# Patient Record
Sex: MALE | Race: WHITE | NOT HISPANIC OR LATINO | Employment: FULL TIME | URBAN - METROPOLITAN AREA
[De-identification: names, ages, dates, MRNs, and addresses within clinical notes are randomized per-mention and may not be internally consistent; named-entity substitution may affect disease eponyms.]

---

## 2023-07-19 ENCOUNTER — OFFICE VISIT (OUTPATIENT)
Dept: FAMILY MEDICINE CLINIC | Facility: CLINIC | Age: 33
End: 2023-07-19
Payer: COMMERCIAL

## 2023-07-19 VITALS
DIASTOLIC BLOOD PRESSURE: 88 MMHG | WEIGHT: 292 LBS | HEIGHT: 73 IN | RESPIRATION RATE: 16 BRPM | OXYGEN SATURATION: 96 % | SYSTOLIC BLOOD PRESSURE: 124 MMHG | HEART RATE: 76 BPM | BODY MASS INDEX: 38.7 KG/M2

## 2023-07-19 DIAGNOSIS — K52.9 COLITIS: ICD-10-CM

## 2023-07-19 DIAGNOSIS — J69.0 ASPIRATION PNEUMONIA OF BOTH LUNGS DUE TO REGURGITATED FOOD, UNSPECIFIED PART OF LUNG: Primary | ICD-10-CM

## 2023-07-19 PROCEDURE — 99213 OFFICE O/P EST LOW 20 MIN: CPT | Performed by: NURSE PRACTITIONER

## 2023-08-09 ENCOUNTER — OFFICE VISIT (OUTPATIENT)
Dept: FAMILY MEDICINE CLINIC | Facility: CLINIC | Age: 33
End: 2023-08-09
Payer: COMMERCIAL

## 2023-08-09 VITALS
OXYGEN SATURATION: 96 % | HEART RATE: 79 BPM | SYSTOLIC BLOOD PRESSURE: 116 MMHG | RESPIRATION RATE: 16 BRPM | DIASTOLIC BLOOD PRESSURE: 72 MMHG | WEIGHT: 298 LBS | HEIGHT: 72 IN | BODY MASS INDEX: 40.36 KG/M2 | TEMPERATURE: 97.8 F

## 2023-08-09 DIAGNOSIS — J69.0 ASPIRATION PNEUMONIA OF LEFT LOWER LOBE DUE TO REGURGITATED FOOD: Primary | ICD-10-CM

## 2023-08-09 DIAGNOSIS — R05.9 COUGH, UNSPECIFIED TYPE: ICD-10-CM

## 2023-08-09 DIAGNOSIS — E66.01 MORBID OBESITY WITH BMI OF 40.0-44.9, ADULT: ICD-10-CM

## 2023-08-09 PROCEDURE — 99213 OFFICE O/P EST LOW 20 MIN: CPT | Performed by: NURSE PRACTITIONER

## 2023-08-09 NOTE — PROGRESS NOTES
"Chief Complaint  Pneumonia (Follow-up)    Subjective          Rafiq Lantigua presents to [facility] for  History of Present Illness  He is here to follow-up on pneumonia.  He denies any SOB, heart palpitations, fever, and chills.  He is able to walk/run on treadmill at speed of 3.5 to 7mph and legs get a little tired but no SOB.    Cough - he reports cough started about 4-5 days ago.  Cough is productive with clear mucus.  He feels PND and no other symptoms.    Review of Systems   Constitutional:  Negative for chills and fever.   HENT:  Positive for postnasal drip. Negative for congestion, ear discharge, ear pain, rhinorrhea, sinus pressure, sinus pain, sneezing, sore throat and trouble swallowing.    Eyes:  Negative for pain, discharge and itching.   Respiratory:  Negative for shortness of breath.    Cardiovascular:  Negative for chest pain and palpitations.   Neurological:  Negative for dizziness and light-headedness.       Objective   Vital Signs:   /72 (BP Location: Right arm, Patient Position: Sitting)   Pulse 79   Temp 97.8 øF (36.6 øC) (Oral)   Resp 16   Ht 182.9 cm (72\")   Wt 135 kg (298 lb)   SpO2 96%   BMI 40.42 kg/mý     Physical Exam  Vitals and nursing note reviewed.   Constitutional:       General: He is not in acute distress.     Appearance: Normal appearance. He is not ill-appearing.   HENT:      Head: Normocephalic and atraumatic.   Eyes:      Conjunctiva/sclera: Conjunctivae normal.   Cardiovascular:      Rate and Rhythm: Normal rate and regular rhythm.      Heart sounds: Normal heart sounds. No murmur heard.  Pulmonary:      Effort: Pulmonary effort is normal. No respiratory distress.      Breath sounds: Normal breath sounds. No wheezing, rhonchi or rales.      Comments: Occasional cough  Musculoskeletal:      Cervical back: Neck supple.   Lymphadenopathy:      Cervical: No cervical adenopathy.   Skin:     General: Skin is warm and dry.      Findings: No " erythema.   Neurological:      General: No focal deficit present.      Mental Status: He is alert.   Psychiatric:         Mood and Affect: Mood normal.      Result Review :   The following data was reviewed by: CHELY Borrero on 08/09/2023:  CMP          7/5/2023    15:11   CMP   Glucose 87    BUN 9    Creatinine 0.98    Sodium 140    Potassium 4.4    Chloride 102    Calcium 9.5    Total Protein 7.6    Albumin 4.5    Globulin 3.1    Total Bilirubin 0.5    Alkaline Phosphatase 88    AST (SGOT) 18    ALT (SGPT) 18    BUN/Creatinine Ratio 9      CBC w/diff          7/5/2023    15:11   CBC w/Diff   WBC 9.0    RBC 5.68    Hemoglobin 15.8    Hematocrit 46.1    MCV 81    MCH 27.8    MCHC 34.3    RDW 14.5    Platelets 264    Neutrophil Rel % 51    Lymphocyte Rel % 30    Monocyte Rel % 14    Eosinophil Rel % 4    Basophil Rel % 1      Data reviewed : Radiologic studies Chest X-ray 7/5/23.           Assessment and Plan    Diagnoses and all orders for this visit:    1. Aspiration pneumonia of left lower lobe due to regurgitated food (Primary)  Comments:  Improving.  Increase activity as tolerated.  Will continue to monitor.    2. Cough, unspecified type  Comments:  Start (OTC) Mucinex as directed until cough/PND resolved.  Take Mucinex w/8oz water and increase water intake.  Will continue to monitor    3. Morbid obesity with BMI of 40.0-44.9, adult  Assessment & Plan:  Patient's (Body mass index is 40.42 kg/mý.) indicates that they are morbidly/severely obese (BMI > 40 or > 35 with obesity - related health condition) with health conditions that include none . Weight is newly identified. BMI  is above average; BMI management plan is completed. We discussed low calorie, low carb based diet program, portion control, and increasing exercise.   Discussed and provided information on calorie counting and exercise.  Will continue to monitor.          Follow Up   Return in about 3 months (around 11/9/2023) for Next scheduled  follow up Pneumonia.  Patient was given instructions and counseling regarding his condition or for health maintenance advice. Please see specific information pulled into the AVS if appropriate.

## 2023-08-31 PROBLEM — E66.01 MORBID OBESITY WITH BMI OF 40.0-44.9, ADULT: Status: ACTIVE | Noted: 2023-08-31

## 2023-09-01 NOTE — ASSESSMENT & PLAN NOTE
Patient's (Body mass index is 40.42 kg/mý.) indicates that they are morbidly/severely obese (BMI > 40 or > 35 with obesity - related health condition) with health conditions that include none . Weight is newly identified. BMI  is above average; BMI management plan is completed. We discussed low calorie, low carb based diet program, portion control, and increasing exercise.   Discussed and provided information on calorie counting and exercise.  Will continue to monitor.

## 2023-11-13 ENCOUNTER — OFFICE VISIT (OUTPATIENT)
Dept: GASTROENTEROLOGY | Facility: CLINIC | Age: 33
End: 2023-11-13
Payer: COMMERCIAL

## 2023-11-13 ENCOUNTER — SPECIALTY PHARMACY (OUTPATIENT)
Dept: GASTROENTEROLOGY | Facility: CLINIC | Age: 33
End: 2023-11-13
Payer: COMMERCIAL

## 2023-11-13 ENCOUNTER — PREP FOR SURGERY (OUTPATIENT)
Dept: SURGERY | Facility: SURGERY CENTER | Age: 33
End: 2023-11-13
Payer: COMMERCIAL

## 2023-11-13 VITALS
TEMPERATURE: 98.9 F | DIASTOLIC BLOOD PRESSURE: 68 MMHG | BODY MASS INDEX: 39.25 KG/M2 | SYSTOLIC BLOOD PRESSURE: 110 MMHG | WEIGHT: 289.8 LBS | HEART RATE: 89 BPM | OXYGEN SATURATION: 97 % | HEIGHT: 72 IN

## 2023-11-13 DIAGNOSIS — R14.2 BELCHING: ICD-10-CM

## 2023-11-13 DIAGNOSIS — Z79.899 HIGH RISK MEDICATION USE: ICD-10-CM

## 2023-11-13 DIAGNOSIS — K51.00 ULCERATIVE PANCOLITIS WITHOUT COMPLICATION: Primary | ICD-10-CM

## 2023-11-13 PROCEDURE — 99214 OFFICE O/P EST MOD 30 MIN: CPT | Performed by: NURSE PRACTITIONER

## 2023-11-13 RX ORDER — SODIUM CHLORIDE, SODIUM LACTATE, POTASSIUM CHLORIDE, CALCIUM CHLORIDE 600; 310; 30; 20 MG/100ML; MG/100ML; MG/100ML; MG/100ML
30 INJECTION, SOLUTION INTRAVENOUS CONTINUOUS PRN
OUTPATIENT
Start: 2023-11-13

## 2023-11-13 RX ORDER — SODIUM CHLORIDE 0.9 % (FLUSH) 0.9 %
3 SYRINGE (ML) INJECTION EVERY 12 HOURS SCHEDULED
OUTPATIENT
Start: 2023-11-13

## 2023-11-13 RX ORDER — SODIUM CHLORIDE 0.9 % (FLUSH) 0.9 %
10 SYRINGE (ML) INJECTION AS NEEDED
OUTPATIENT
Start: 2023-11-13

## 2023-11-13 NOTE — PROGRESS NOTES
Specialty Pharmacy     Humira q 7 days    called [facility] #[phone] for pharmacy info - patient is not active and needs to contact his HR. called [facility] health administators #[phone] patient is active and pharmacy needs to update their side. 48 hour call back      Suzannaeduardo Richards  Specialty Pharmacy Technician

## 2023-11-13 NOTE — PROGRESS NOTES
"Chief Complaint   Patient presents with    Ulcerative Colitis         History of Present Illness  Patient is a 32-year-old male who presents today for evaluation. He was referred for colitis.    Patient has a history of ulcerative colitis, diagnosed around 12 years ago.  He recently relocated to [place] and presents today to establish care.  He reports a history of pancolitis with the left side of his colon being more severely affected.    He has previously tried and failed Apriso, 6 MP, Entyvio, Remicade, methotrexate, and Stelara.  He has required prednisone and budesonide in the past and been hospitalized once for ulcerative colitis.    Over the last 2 years he has been taking Humira on a weekly basis which he reports has worked the best of any therapy he has tried.  He reports he feels about 90% back to baseline.    He has some symptoms he attributes to irritable bowel syndrome.  Reports generally has around 2 bowel movements per day, at times experiences fecal urgency.  He has been using Imodium and IBgard as needed.  He has had no blood in the stool.  He has some discomfort in his lower abdomen in the morning.    His last colonoscopy was around 2 years ago.    He over the last several months has noted increased belching.  Reports he has heartburn though this is rare.  Denies any nausea or vomiting.    Requests OsmoPrep for bowel prep.     Result Review :           Vital Signs:   /68   Pulse 89   Temp 98.9 °F (37.2 °C)   Ht 182.9 cm (72\")   Wt 131 kg (289 lb 12.8 oz)   SpO2 97%   BMI 39.30 kg/m²     Body mass index is 39.3 kg/m².     Physical Exam  Vitals reviewed.   Constitutional:       General: He is not in acute distress.     Appearance: He is well-developed.   HENT:      Head: Normocephalic and atraumatic.   Pulmonary:      Effort: Pulmonary effort is normal. No respiratory distress.   Abdominal:      General: Abdomen is flat. Bowel sounds are normal. There is no distension.      Palpations: " Abdomen is soft.      Tenderness: There is no abdominal tenderness.   Skin:     General: Skin is dry.      Coloration: Skin is not pale.   Neurological:      Mental Status: He is alert and oriented to person, place, and time.   Psychiatric:         Thought Content: Thought content normal.           Assessment and Plan    Diagnoses and all orders for this visit:    1. Ulcerative pancolitis without complication (Primary)  -     CBC & Differential  -     Comprehensive Metabolic Panel  -     C-reactive Protein  -     QuantiFERON TB Gold  -     Hepatitis Panel, Acute    2. High risk medication use  -     CBC & Differential  -     Comprehensive Metabolic Panel  -     C-reactive Protein  -     QuantiFERON TB Gold  -     Hepatitis Panel, Acute    3. Belching         Discussion  Patient presents today for evaluation.  He has a longstanding history of ulcerative colitis, currently treated with weekly Humira.  Symptomatically he is doing well at this time.  We will continue Humira on a weekly basis and schedule updated colonoscopy for surveillance.  He has also noted new symptom of belching with intermittent heartburn.  We will schedule EGD at time of colonoscopy to evaluate for any evidence of esophagitis, hiatal hernia, peptic ulcer disease, or H. pylori infection that could be contributing to this.  We reviewed health maintenance concerns as it pertains to high risk medication use at today's office visit and we will update lab work for monitoring.  We will provide further treatment recommendations dependent upon endoscopic findings and clinical course.          Follow Up   Return for Follow up to review results after testing complete.    Patient Instructions   Schedule EGD and colonoscopy for further evaluation.     For ulcerative colitis, continue Humira 40 mg once per week.    Check lab work today for monitoring.    Due to high risk medication use, it is recommended to stay up to date on vaccinations including a yearly  flu vaccine. Do not receive any live virus vaccines. If you are ill, have a fever, or require an antibiotic please contact the office as we may consider holding dose of medication. Recommend yearly skin checks with dermatology.

## 2023-11-13 NOTE — PATIENT INSTRUCTIONS
Schedule EGD and colonoscopy for further evaluation.     For ulcerative colitis, continue Humira 40 mg once per week.    Check lab work today for monitoring.    Due to high risk medication use, it is recommended to stay up to date on vaccinations including a yearly flu vaccine. Do not receive any live virus vaccines. If you are ill, have a fever, or require an antibiotic please contact the office as we may consider holding dose of medication. Recommend yearly skin checks with dermatology.

## 2023-11-15 LAB
ALBUMIN SERPL-MCNC: 4.5 G/DL (ref 4.3–5.2)
ALBUMIN/GLOB SERPL: 1.4 {RATIO} (ref 1.2–2.2)
ALP SERPL-CCNC: 88 IU/L (ref 44–121)
ALT SERPL-CCNC: 14 IU/L (ref 0–44)
AST SERPL-CCNC: 13 IU/L (ref 0–40)
BASOPHILS # BLD AUTO: 0.1 X10E3/UL (ref 0–0.2)
BASOPHILS NFR BLD AUTO: 1 %
BILIRUB SERPL-MCNC: 0.3 MG/DL (ref 0–1.2)
BUN SERPL-MCNC: 15 MG/DL (ref 6–20)
BUN/CREAT SERPL: 14 (ref 9–20)
CALCIUM SERPL-MCNC: 9.7 MG/DL (ref 8.7–10.2)
CHLORIDE SERPL-SCNC: 102 MMOL/L (ref 96–106)
CO2 SERPL-SCNC: 27 MMOL/L (ref 20–29)
CREAT SERPL-MCNC: 1.08 MG/DL (ref 0.76–1.27)
CRP SERPL-MCNC: 6 MG/L (ref 0–10)
EGFRCR SERPLBLD CKD-EPI 2021: 95 ML/MIN/1.73
EOSINOPHIL # BLD AUTO: 0.2 X10E3/UL (ref 0–0.4)
EOSINOPHIL NFR BLD AUTO: 2 %
ERYTHROCYTE [DISTWIDTH] IN BLOOD BY AUTOMATED COUNT: 14 % (ref 11.6–15.4)
GAMMA INTERFERON BACKGROUND BLD IA-ACNC: 0.02 IU/ML
GLOBULIN SER CALC-MCNC: 3.3 G/DL (ref 1.5–4.5)
GLUCOSE SERPL-MCNC: 81 MG/DL (ref 70–99)
HAV IGM SERPL QL IA: NEGATIVE
HBV CORE IGM SERPL QL IA: NEGATIVE
HBV SURFACE AG SERPL QL IA: NEGATIVE
HCT VFR BLD AUTO: 44.8 % (ref 37.5–51)
HCV AB SERPL QL IA: NORMAL
HCV IGG SERPL QL IA: NON REACTIVE
HGB BLD-MCNC: 14.4 G/DL (ref 13–17.7)
IMM GRANULOCYTES # BLD AUTO: 0 X10E3/UL (ref 0–0.1)
IMM GRANULOCYTES NFR BLD AUTO: 0 %
LYMPHOCYTES # BLD AUTO: 2.8 X10E3/UL (ref 0.7–3.1)
LYMPHOCYTES NFR BLD AUTO: 22 %
M TB IFN-G BLD-IMP: NEGATIVE
M TB IFN-G CD4+ T-CELLS BLD-ACNC: 0.01 IU/ML
M TBIFN-G CD4+ CD8+T-CELLS BLD-ACNC: 0.02 IU/ML
MCH RBC QN AUTO: 26.1 PG (ref 26.6–33)
MCHC RBC AUTO-ENTMCNC: 32.1 G/DL (ref 31.5–35.7)
MCV RBC AUTO: 81 FL (ref 79–97)
MITOGEN IGNF BLD-ACNC: >10 IU/ML
MONOCYTES # BLD AUTO: 1.2 X10E3/UL (ref 0.1–0.9)
MONOCYTES NFR BLD AUTO: 10 %
NEUTROPHILS # BLD AUTO: 8.2 X10E3/UL (ref 1.4–7)
NEUTROPHILS NFR BLD AUTO: 65 %
PLATELET # BLD AUTO: 332 X10E3/UL (ref 150–450)
POTASSIUM SERPL-SCNC: 4.1 MMOL/L (ref 3.5–5.2)
PROT SERPL-MCNC: 7.8 G/DL (ref 6–8.5)
QUANTIFERON INCUBATION: NORMAL
RBC # BLD AUTO: 5.51 X10E6/UL (ref 4.14–5.8)
SERVICE CMNT-IMP: NORMAL
SODIUM SERPL-SCNC: 142 MMOL/L (ref 134–144)
WBC # BLD AUTO: 12.5 X10E3/UL (ref 3.4–10.8)

## 2023-11-16 RX ORDER — SODIUM PHOSPHATE, MONOBASIC, MONOHYDRATE, SODIUM PHOSPHATE, DIBASIC ANHYDROUS 1.102; .398 G/1; G/1
TABLET ORAL
Qty: 32 TABLET | Refills: 0 | Status: SHIPPED | OUTPATIENT
Start: 2023-11-16

## 2023-11-17 ENCOUNTER — DOCUMENTATION (OUTPATIENT)
Dept: GASTROENTEROLOGY | Facility: CLINIC | Age: 33
End: 2023-11-17
Payer: COMMERCIAL

## 2023-11-17 NOTE — PROGRESS NOTES
Specialty Pharmacy     Faxed PA for Humira to [facility] 11/16/23     Suzanna Richards  Specialty Pharmacy Technician

## 2023-11-20 ENCOUNTER — SPECIALTY PHARMACY (OUTPATIENT)
Dept: GASTROENTEROLOGY | Facility: CLINIC | Age: 33
End: 2023-11-20
Payer: COMMERCIAL

## 2023-11-20 DIAGNOSIS — K51.00 ULCERATIVE PANCOLITIS WITHOUT COMPLICATION: Primary | ICD-10-CM

## 2023-11-20 NOTE — PROGRESS NOTES
Specialty Pharmacy    Humira q 7 days PA sent  EOC ID: [id number]     Suzanna Richards  Specialty Pharmacy Technician

## 2023-11-20 NOTE — PROGRESS NOTES
Specialty Pharmacy     Humira PA approved until 11/20/24  Can be filled anywhere or [facility]      Suzanna Richards  Specialty Pharmacy Technician

## 2023-11-21 RX ORDER — ADALIMUMAB 40MG/0.4ML
40 KIT SUBCUTANEOUS
Qty: 4 EACH | Refills: 5 | Status: SHIPPED | OUTPATIENT
Start: 2023-11-21

## 2024-01-12 ENCOUNTER — TELEPHONE (OUTPATIENT)
Dept: GASTROENTEROLOGY | Facility: CLINIC | Age: 34
End: 2024-01-12
Payer: COMMERCIAL

## 2024-01-12 DIAGNOSIS — K51.00 ULCERATIVE PANCOLITIS WITHOUT COMPLICATION: ICD-10-CM

## 2024-01-12 NOTE — TELEPHONE ENCOUNTER
Please disregard previous message about Humira.  Patient's wife gave wrong pharmacy number.  Will call back with correct number this afternoon or Monday.

## 2024-01-12 NOTE — TELEPHONE ENCOUNTER
Patient's pharmacy called requesting RX for Humira be sent in for patient. Pharmacy is correct in chart.

## 2024-01-15 RX ORDER — ADALIMUMAB 40MG/0.4ML
40 KIT SUBCUTANEOUS
Qty: 4 EACH | Refills: 5 | Status: SHIPPED | OUTPATIENT
Start: 2024-01-15

## 2024-01-29 ENCOUNTER — TELEPHONE (OUTPATIENT)
Dept: GASTROENTEROLOGY | Facility: CLINIC | Age: 34
End: 2024-01-29
Payer: COMMERCIAL

## 2024-01-29 NOTE — TELEPHONE ENCOUNTER
----- Message from Samantha Weiss sent at 1/29/2024  9:22 AM EST -----  Regarding: CANCEL SCOPE 2/5 - [facility]  Patient's wife left message on [employer] to cancel scope. Please call to reschedule.

## 2024-01-29 NOTE — TELEPHONE ENCOUNTER
Caller: RAI CROOK    Relationship to patient: Emergency Contact    Best call back number: [phone]     Chief complaint:     Type of visit: COLONOSCOPY    Requested date: NONE     If rescheduling, when is the original appointment: 2/5/24     Additional notes:PT WILL CALL BACK WHEN READY TO RESCHEDULE HAVE CHANGED PAYOR.

## 2024-02-06 ENCOUNTER — OFFICE VISIT (OUTPATIENT)
Dept: FAMILY MEDICINE CLINIC | Facility: CLINIC | Age: 34
End: 2024-02-06
Payer: COMMERCIAL

## 2024-02-06 VITALS
SYSTOLIC BLOOD PRESSURE: 136 MMHG | HEIGHT: 72 IN | OXYGEN SATURATION: 96 % | TEMPERATURE: 98.6 F | HEART RATE: 80 BPM | WEIGHT: 287 LBS | DIASTOLIC BLOOD PRESSURE: 88 MMHG | BODY MASS INDEX: 38.87 KG/M2

## 2024-02-06 DIAGNOSIS — J06.9 VIRAL URI WITH COUGH: Primary | ICD-10-CM

## 2024-02-06 LAB
EXPIRATION DATE: ABNORMAL
EXPIRATION DATE: NORMAL
FLUAV AG UPPER RESP QL IA.RAPID: NOT DETECTED
FLUBV AG UPPER RESP QL IA.RAPID: NOT DETECTED
INTERNAL CONTROL: ABNORMAL
INTERNAL CONTROL: NORMAL
Lab: ABNORMAL
Lab: NORMAL
S PYO AG THROAT QL: NEGATIVE
SARS-COV-2 AG UPPER RESP QL IA.RAPID: NOT DETECTED

## 2024-02-06 PROCEDURE — 87880 STREP A ASSAY W/OPTIC: CPT | Performed by: NURSE PRACTITIONER

## 2024-02-06 PROCEDURE — 99213 OFFICE O/P EST LOW 20 MIN: CPT | Performed by: NURSE PRACTITIONER

## 2024-02-06 PROCEDURE — 87428 SARSCOV & INF VIR A&B AG IA: CPT | Performed by: NURSE PRACTITIONER

## 2024-02-06 RX ORDER — GUAIFENESIN 600 MG/1
1200 TABLET, EXTENDED RELEASE ORAL 2 TIMES DAILY
Qty: 40 TABLET | Refills: 0 | Status: SHIPPED | OUTPATIENT
Start: 2024-02-06

## 2024-07-15 ENCOUNTER — TELEPHONE (OUTPATIENT)
Dept: GASTROENTEROLOGY | Facility: CLINIC | Age: 34
End: 2024-07-15
Payer: COMMERCIAL

## 2024-07-15 DIAGNOSIS — K51.00 ULCERATIVE PANCOLITIS WITHOUT COMPLICATION: ICD-10-CM

## 2024-07-15 NOTE — TELEPHONE ENCOUNTER
Patient's wife left a Voice Message requesting a refill of HUMIRA. Pt is out of it now.     LOV 11/13/2023    NOV n    His new insurance is [employer]  Pharmacy is [facility], Fax [phone]

## 2024-07-16 RX ORDER — ADALIMUMAB 40MG/0.4ML
40 KIT SUBCUTANEOUS
Qty: 4 EACH | Refills: 5 | Status: SHIPPED | OUTPATIENT
Start: 2024-07-16

## 2024-08-02 ENCOUNTER — TELEPHONE (OUTPATIENT)
Dept: GASTROENTEROLOGY | Facility: CLINIC | Age: 34
End: 2024-08-02
Payer: COMMERCIAL

## 2024-08-02 NOTE — TELEPHONE ENCOUNTER
Notified Shawanda that patient's rx was sent in on 07/16/2024.  I called [facility] and was told that they didn't have the PA so they placed the rx on hold.  The PA was approved on 07/27.  Gave approval dates and reference number to pharmacy and asked that they shai it as urgent.  sharan

## 2024-08-02 NOTE — TELEPHONE ENCOUNTER
Hub staff attempted to follow warm transfer process and was unsuccessful     Caller: RAI CROOK    Relationship to patient: Emergency Contact    Best call back number: [phone]    Patient is needing: PT WIFE IS CALLING TO CHECK ON MEDICATION HUMIRA. THE PHARMACY SAID THEY HAVE NOT RECEIVED IT. THE PHARMACY FOR MEDICATION IS [facility].

## 2025-07-25 ENCOUNTER — TELEPHONE (OUTPATIENT)
Dept: GASTROENTEROLOGY | Facility: CLINIC | Age: 35
End: 2025-07-25
Payer: COMMERCIAL

## 2025-07-25 NOTE — TELEPHONE ENCOUNTER
7/25/25 12:36 PM, Attemped to call pt to let him know he needs an ov for Humira PA. Wrong phone # for this pt. OV needed for UC/Humira monitoring, per [facility]. [place]

## 2025-07-30 ENCOUNTER — SPECIALTY PHARMACY (OUTPATIENT)
Dept: GASTROENTEROLOGY | Facility: CLINIC | Age: 35
End: 2025-07-30
Payer: COMMERCIAL